# Patient Record
Sex: FEMALE | Race: WHITE | Employment: UNEMPLOYED | URBAN - METROPOLITAN AREA
[De-identification: names, ages, dates, MRNs, and addresses within clinical notes are randomized per-mention and may not be internally consistent; named-entity substitution may affect disease eponyms.]

---

## 2017-08-29 ENCOUNTER — PROCEDURE VISIT (OUTPATIENT)
Dept: OBGYN CLINIC | Age: 37
End: 2017-08-29
Payer: COMMERCIAL

## 2017-08-29 VITALS
WEIGHT: 155.8 LBS | DIASTOLIC BLOOD PRESSURE: 69 MMHG | HEART RATE: 79 BPM | BODY MASS INDEX: 23.61 KG/M2 | SYSTOLIC BLOOD PRESSURE: 108 MMHG | HEIGHT: 68 IN

## 2017-08-29 DIAGNOSIS — Z01.419 ENCOUNTER FOR ROUTINE EXAMINATION FOR CONTRACEPTION: ICD-10-CM

## 2017-08-29 DIAGNOSIS — Z30.432 ENCOUNTER FOR IUD REMOVAL: Primary | ICD-10-CM

## 2017-08-29 PROCEDURE — 58301 REMOVE INTRAUTERINE DEVICE: CPT | Performed by: ADVANCED PRACTICE MIDWIFE

## 2017-08-29 RX ORDER — ESCITALOPRAM OXALATE 10 MG/1
TABLET ORAL
COMMUNITY
Start: 2017-07-31

## 2017-08-29 RX ORDER — ETONOGESTREL AND ETHINYL ESTRADIOL 11.7; 2.7 MG/1; MG/1
1 INSERT, EXTENDED RELEASE VAGINAL
Qty: 3 EACH | Refills: 3 | Status: SHIPPED | OUTPATIENT
Start: 2017-08-29 | End: 2018-07-20 | Stop reason: SDUPTHER

## 2018-07-20 DIAGNOSIS — Z01.419 ENCOUNTER FOR ROUTINE EXAMINATION FOR CONTRACEPTION: ICD-10-CM

## 2018-07-20 NOTE — TELEPHONE ENCOUNTER
Medication: Nuvaring  Last visit: 8/29/17 - iud removal   Next visit: Visit date not found  Last refill: 8/29/17  Pharmacy: [facility] in [place] [facility]  PCP:           Lynnette Ramsey DO

## 2018-07-24 RX ORDER — ETONOGESTREL/ETHINYL ESTRADIOL .12-.015MG
RING, VAGINAL VAGINAL
Qty: 1 EACH | Refills: 0 | Status: SHIPPED | OUTPATIENT
Start: 2018-07-24 | End: 2018-08-28 | Stop reason: SDUPTHER

## 2018-08-28 DIAGNOSIS — Z01.419 ENCOUNTER FOR ROUTINE EXAMINATION FOR CONTRACEPTION: ICD-10-CM

## 2018-08-28 RX ORDER — ETONOGESTREL AND ETHINYL ESTRADIOL 11.7; 2.7 MG/1; MG/1
1 INSERT, EXTENDED RELEASE VAGINAL SEE ADMIN INSTRUCTIONS
Qty: 1 EACH | Refills: 0 | Status: SHIPPED | OUTPATIENT
Start: 2018-08-28 | End: 2020-10-22 | Stop reason: SDUPTHER

## 2018-09-06 ENCOUNTER — OFFICE VISIT (OUTPATIENT)
Dept: OBGYN CLINIC | Age: 38
End: 2018-09-06
Payer: COMMERCIAL

## 2018-09-06 ENCOUNTER — HOSPITAL ENCOUNTER (OUTPATIENT)
Age: 38
Setting detail: SPECIMEN
Discharge: HOME | End: 2018-09-06
Payer: COMMERCIAL

## 2018-09-06 VITALS
DIASTOLIC BLOOD PRESSURE: 85 MMHG | HEART RATE: 99 BPM | SYSTOLIC BLOOD PRESSURE: 139 MMHG | HEIGHT: 68 IN | BODY MASS INDEX: 25.31 KG/M2 | WEIGHT: 167 LBS

## 2018-09-06 DIAGNOSIS — Z01.419 ENCOUNTER FOR WELL WOMAN EXAM: Primary | ICD-10-CM

## 2018-09-06 DIAGNOSIS — N94.6 DYSMENORRHEA: ICD-10-CM

## 2018-09-06 PROCEDURE — 99395 PREV VISIT EST AGE 18-39: CPT | Performed by: ADVANCED PRACTICE MIDWIFE

## 2018-09-06 RX ORDER — ETONOGESTREL AND ETHINYL ESTRADIOL 11.7; 2.7 MG/1; MG/1
1 INSERT, EXTENDED RELEASE VAGINAL
Qty: 1 EACH | Refills: 12 | Status: SHIPPED | OUTPATIENT
Start: 2018-09-06 | End: 2019-10-09 | Stop reason: SDUPTHER

## 2018-09-06 NOTE — PROGRESS NOTES
Subjective:  Brandan Linder is a 41 y.o. female who presents for an annual exam.     HPI:  The patient has no complaints today. Likes NuvaRing for cycle control. Preventative Health Screening:  The patient is sexually active. No new partners. Orientation: male  last pap: was normal year       HPV typing: neg  Date   Last Mammogram: year no  Any recent screening labs: yes, yesterday through work  Regular exercise: yes Type:yoga and walking, frequency: 3, Explored exercise options no  Recent transfusion or tattoos?no  The patient reports that domestic violence in her life is absent  Wears seatbelts: yes  Smoking: no    Vape: no  Alcohol use: occasional  Recreational drug use: no  In the last 2 weeks have you had little pleasure in doing things yes, but no concerns  In the past 2 weeks have you had difficulty sleeping no  Family planning choices: Ring, undecided if future pregnancy  Desires pregnancy in the next year: no  BRCA testing: no  Date of last DEXA:no  Date of last Mammogram: no  Colonscopy screening completed: no  History of gestational diabetes: no  Weight management: no issues  Diet review: no    Gynecological history:  OB History    Para Term  AB Living   2 2 1 0 0 2   SAB TAB Ectopic Molar Multiple Live Births   0 0 0   0 2      # Outcome Date GA Lbr Brodie/2nd Weight Sex Delivery Anes PTL Lv   2 Term 07/12/15 39w4d  8 lb 3 oz (3.714 kg) F Vag-Spont None N SUGAR   1 Para 13   8 lb 3 oz (3.714 kg) M Vag-Spont   SUGAR      Birth Comments: System Generated. Please review and update pregnancy details. Menarche age: 15       Current Cycle regular yes, days 4, flow light to medium   Patient's last menstrual period was 2018. Menopause age: no             Patient's medications, allergies, past medical, surgical, social and family histories were reviewed and updated as appropriate.   Hormone Replacement: no    STD history: HSV, no outbreaks for years      Review of

## 2018-09-10 LAB
HPV SAMPLE: NORMAL
HPV SOURCE: NORMAL
HPV, GENOTYPE 16: NOT DETECTED
HPV, GENOTYPE 18: NOT DETECTED
HPV, HIGH RISK OTHER: NOT DETECTED
HPV, INTERPRETATION: NORMAL

## 2018-09-20 LAB — CYTOLOGY REPORT: NORMAL

## 2018-10-06 PROBLEM — Z01.419 ENCOUNTER FOR WELL WOMAN EXAM: Status: RESOLVED | Noted: 2018-09-06 | Resolved: 2018-10-06

## 2019-09-26 ENCOUNTER — TELEPHONE (OUTPATIENT)
Dept: OBGYN CLINIC | Age: 39
End: 2019-09-26

## 2019-09-26 DIAGNOSIS — Z30.44 ENCOUNTER FOR SURVEILLANCE OF VAGINAL RING HORMONAL CONTRACEPTIVE DEVICE: Primary | ICD-10-CM

## 2019-09-26 DIAGNOSIS — N94.6 DYSMENORRHEA: ICD-10-CM

## 2019-09-26 RX ORDER — ETONOGESTREL AND ETHINYL ESTRADIOL 11.7; 2.7 MG/1; MG/1
1 INSERT, EXTENDED RELEASE VAGINAL
Qty: 1 EACH | Refills: 0 | Status: SHIPPED
Start: 2019-09-26 | End: 2020-10-22 | Stop reason: SDUPTHER

## 2019-10-09 ENCOUNTER — OFFICE VISIT (OUTPATIENT)
Dept: OBGYN CLINIC | Age: 39
End: 2019-10-09
Payer: COMMERCIAL

## 2019-10-09 VITALS
HEIGHT: 70 IN | WEIGHT: 162 LBS | BODY MASS INDEX: 23.19 KG/M2 | HEART RATE: 76 BPM | SYSTOLIC BLOOD PRESSURE: 117 MMHG | DIASTOLIC BLOOD PRESSURE: 76 MMHG

## 2019-10-09 DIAGNOSIS — N94.6 DYSMENORRHEA: ICD-10-CM

## 2019-10-09 DIAGNOSIS — Z30.44 ENCOUNTER FOR SURVEILLANCE OF VAGINAL RING HORMONAL CONTRACEPTIVE DEVICE: ICD-10-CM

## 2019-10-09 DIAGNOSIS — Z01.419 WOMEN'S ANNUAL ROUTINE GYNECOLOGICAL EXAMINATION: Primary | ICD-10-CM

## 2019-10-09 PROCEDURE — G8484 FLU IMMUNIZE NO ADMIN: HCPCS | Performed by: ADVANCED PRACTICE MIDWIFE

## 2019-10-09 PROCEDURE — 99395 PREV VISIT EST AGE 18-39: CPT | Performed by: ADVANCED PRACTICE MIDWIFE

## 2019-10-09 RX ORDER — ETONOGESTREL AND ETHINYL ESTRADIOL 11.7; 2.7 MG/1; MG/1
1 INSERT, EXTENDED RELEASE VAGINAL
Qty: 1 EACH | Refills: 12 | Status: SHIPPED
Start: 2019-10-09 | End: 2020-10-22 | Stop reason: SDUPTHER

## 2019-10-10 ASSESSMENT — ENCOUNTER SYMPTOMS
ALLERGIC/IMMUNOLOGIC NEGATIVE: 1
GASTROINTESTINAL NEGATIVE: 1
RESPIRATORY NEGATIVE: 1
EYES NEGATIVE: 1

## 2019-11-13 ENCOUNTER — OFFICE VISIT (OUTPATIENT)
Dept: OBGYN CLINIC | Age: 39
End: 2019-11-13
Payer: COMMERCIAL

## 2019-11-13 VITALS
SYSTOLIC BLOOD PRESSURE: 128 MMHG | HEIGHT: 67 IN | BODY MASS INDEX: 25.43 KG/M2 | HEART RATE: 70 BPM | DIASTOLIC BLOOD PRESSURE: 82 MMHG | WEIGHT: 162 LBS

## 2019-11-13 DIAGNOSIS — Z91.89 INCREASED RISK OF BREAST CANCER: Primary | ICD-10-CM

## 2019-11-13 DIAGNOSIS — Z12.12 SCREENING FOR COLORECTAL CANCER: ICD-10-CM

## 2019-11-13 DIAGNOSIS — Z12.11 SCREENING FOR COLORECTAL CANCER: ICD-10-CM

## 2019-11-13 PROCEDURE — G8484 FLU IMMUNIZE NO ADMIN: HCPCS | Performed by: ADVANCED PRACTICE MIDWIFE

## 2019-11-13 PROCEDURE — G8419 CALC BMI OUT NRM PARAM NOF/U: HCPCS | Performed by: ADVANCED PRACTICE MIDWIFE

## 2019-11-13 PROCEDURE — G8427 DOCREV CUR MEDS BY ELIG CLIN: HCPCS | Performed by: ADVANCED PRACTICE MIDWIFE

## 2019-11-13 PROCEDURE — 1036F TOBACCO NON-USER: CPT | Performed by: ADVANCED PRACTICE MIDWIFE

## 2019-11-13 PROCEDURE — 99212 OFFICE O/P EST SF 10 MIN: CPT | Performed by: ADVANCED PRACTICE MIDWIFE

## 2019-12-13 PROBLEM — Z12.12 SCREENING FOR COLORECTAL CANCER: Status: RESOLVED | Noted: 2019-11-13 | Resolved: 2019-12-13

## 2019-12-13 PROBLEM — Z12.11 SCREENING FOR COLORECTAL CANCER: Status: RESOLVED | Noted: 2019-11-13 | Resolved: 2019-12-13

## 2020-10-22 ENCOUNTER — OFFICE VISIT (OUTPATIENT)
Dept: OBGYN CLINIC | Age: 40
End: 2020-10-22
Payer: COMMERCIAL

## 2020-10-22 VITALS
WEIGHT: 177 LBS | DIASTOLIC BLOOD PRESSURE: 72 MMHG | BODY MASS INDEX: 26.83 KG/M2 | HEIGHT: 68 IN | HEART RATE: 96 BPM | SYSTOLIC BLOOD PRESSURE: 104 MMHG

## 2020-10-22 PROCEDURE — G8484 FLU IMMUNIZE NO ADMIN: HCPCS | Performed by: ADVANCED PRACTICE MIDWIFE

## 2020-10-22 PROCEDURE — 99395 PREV VISIT EST AGE 18-39: CPT | Performed by: ADVANCED PRACTICE MIDWIFE

## 2020-10-22 RX ORDER — ETONOGESTREL AND ETHINYL ESTRADIOL 11.7; 2.7 MG/1; MG/1
1 INSERT, EXTENDED RELEASE VAGINAL SEE ADMIN INSTRUCTIONS
Qty: 1 EACH | Refills: 12 | Status: SHIPPED | OUTPATIENT
Start: 2020-10-22 | End: 2022-01-28

## 2020-10-22 RX ORDER — ESCITALOPRAM OXALATE 20 MG/1
20 TABLET ORAL DAILY
COMMUNITY
Start: 2020-07-17

## 2020-10-22 ASSESSMENT — ENCOUNTER SYMPTOMS
VOMITING: 0
ABDOMINAL PAIN: 0
DIARRHEA: 0
SHORTNESS OF BREATH: 0
NAUSEA: 0

## 2020-10-22 ASSESSMENT — PATIENT HEALTH QUESTIONNAIRE - PHQ9
SUM OF ALL RESPONSES TO PHQ QUESTIONS 1-9: 0
SUM OF ALL RESPONSES TO PHQ9 QUESTIONS 1 & 2: 0
SUM OF ALL RESPONSES TO PHQ QUESTIONS 1-9: 0
SUM OF ALL RESPONSES TO PHQ QUESTIONS 1-9: 0
2. FEELING DOWN, DEPRESSED OR HOPELESS: 0
1. LITTLE INTEREST OR PLEASURE IN DOING THINGS: 0

## 2020-10-22 NOTE — PROGRESS NOTES
[place] [id number] [facility] [address]  Dept: [phone]    Patient Name: Monica Pardo  Patient Age: 47 y.o. Date of Visit: 10/22/2020    Subjective  Chief Complaint   Patient presents with   [facility] Gynecologic Exam     prev. pap 9-6-2018 satis/neg      Patient's last menstrual period was 10/19/2020 (exact date). HPI  Patient arrives for annual exam.  Patient denies concerns today. Patient reports is  sexually active with 1 male partner(s). Patient denies  pain with sex. Patient denies a history of sexually transmitted infection(s). Patient does not want screening for sexually transmitted infection(s). Reports is  on contraception reports is on nuvaring. Reports having an IUD before and strongly disliking the side effects of irregular frequent bleeding. Reports having a history of factor V with no personal history of thrombotic events. Reports currently getting iron infusion due to anemia. She reports there is a personal history or family history of:    Smoking (> 15 cigs/day): No    Migraine with Aura:  No    HTN (> 160/100): No    DVT:  No    Thrombophilias:  yes    Stroke (CVA): No     Ischemic heart disease:  No    Valvular heart disease (A Fib, Pul HTN, etc): No    Positive Antiphospholipid Abs:  No    Liver Disease:  No        Review of Systems   Constitutional: Negative for unexpected weight change. Respiratory: Negative for shortness of breath. Cardiovascular: Negative for chest pain, palpitations and leg swelling. Gastrointestinal: Negative for abdominal pain, diarrhea, nausea and vomiting. Genitourinary: Negative for difficulty urinating, dyspareunia, menstrual problem, vaginal discharge and vaginal pain. Neurological: Negative for dizziness, light-headedness and headaches.        Preventive Health Screening:   Date of last pap: 2018 normal               HPV typing/date: 2018  negative    Preventive screening: Yes    Family history of Breast, Ovarian, Colon or Uterine Cancer:  Yes has had previous myraid results     If Yes see scanned worksheet    Objective  /72 (Site: Right Upper Arm, Position: Sitting, Cuff Size: Medium Adult)   Pulse 96   Ht 5' 8.4\" (1.737 m)   Wt 177 lb (80.3 kg)   LMP 10/19/2020 (Exact Date)   BMI 26.60 kg/m²     Intimate Partner Screening HITS Tool <10 negative screen: negative  Hospital Anxiety and Depression Scale (HADS): negative    Gynecologic History  Menarche:regular      Sexually active: Yes  New Sex Partner within 3 months: No  Domestic Violence Screening: negative    STD history: No     Birth control method :Yes       Physical Exam  Constitutional:       Appearance: Normal appearance. Neck:      Musculoskeletal: Normal range of motion. Cardiovascular:      Rate and Rhythm: Normal rate and regular rhythm. Pulmonary:      Effort: Pulmonary effort is normal.      Breath sounds: Normal breath sounds. Neurological:      Mental Status: She is alert and oriented to person, place, and time. Psychiatric:         Mood and Affect: Mood normal.         Behavior: Behavior normal.         Thought Content: Thought content normal.         Judgment: Judgment normal.   Vitals signs reviewed. Assessment & Plan  1. Women's annual routine gynecological examination  - Pap up to date  - Reviewed self breast awareness  - BRCA previously done see media    2. Dysmenorrhea  - Reviewed [facility] (medical eligibility criteria) for contraceptive use that known thrombophilias (factor V) is a category 4 (the risks out weight the benefits and should NOT be used). Patient verbalized she understands that she is at a greater risk than the generally population (anywhere from 2-20 fold increase risk of adverse event) and for her the benefit of the estrogen containing products out weight the risk of blood clot/stroke/heart attack. Reports she has tried an IUD before and did not like it.  Discussed depo/nexplanon and highly recommended switching to these methods that do not contain estrogen and associated increased risk of a blood clot. Reviewed additional concerns for anemia and needing iron transfusion and patient not wanting to stop contraception for concern for further blood loss also reviewed risk of stopping birth control and risks of further pregnancy patient does not want another pregnancy at this time. At this time patient wants to continue with method against providers recommendations will update office if decides to switch to nexplanon/depo. Following with hem/onc for anemia unable to review documents in care everywhere at this time of visit. - etonogestrel-ethinyl estradiol (NUVARING) 0.12-0.015 MG/24HR vaginal ring; Place 1 each vaginally See Admin Instructions  Dispense: 1 each; Refill: 12      Return in about 1 year (around 10/22/2021) for Annual.    Patient was seen with total face to face time of 25 minutes. More than 50% of this visit was on counseling and education regardingher diagnoses and her options. She was also counseled on her preventative health maintenance recommendations and follow-up.     Electronically Signed by Baldev Salas

## 2021-09-24 ENCOUNTER — OFFICE VISIT (OUTPATIENT)
Dept: OBGYN CLINIC | Age: 41
End: 2021-09-24
Payer: COMMERCIAL

## 2021-09-24 VITALS
BODY MASS INDEX: 29.06 KG/M2 | WEIGHT: 193.38 LBS | SYSTOLIC BLOOD PRESSURE: 116 MMHG | DIASTOLIC BLOOD PRESSURE: 70 MMHG | RESPIRATION RATE: 16 BRPM

## 2021-09-24 DIAGNOSIS — N64.4 BREAST PAIN, RIGHT: Primary | ICD-10-CM

## 2021-09-24 PROCEDURE — 1036F TOBACCO NON-USER: CPT | Performed by: ADVANCED PRACTICE MIDWIFE

## 2021-09-24 PROCEDURE — G8427 DOCREV CUR MEDS BY ELIG CLIN: HCPCS | Performed by: ADVANCED PRACTICE MIDWIFE

## 2021-09-24 PROCEDURE — 99213 OFFICE O/P EST LOW 20 MIN: CPT | Performed by: ADVANCED PRACTICE MIDWIFE

## 2021-09-24 PROCEDURE — G8419 CALC BMI OUT NRM PARAM NOF/U: HCPCS | Performed by: ADVANCED PRACTICE MIDWIFE

## 2021-09-24 ASSESSMENT — ENCOUNTER SYMPTOMS
SHORTNESS OF BREATH: 0
ABDOMINAL PAIN: 0
VOMITING: 0
NAUSEA: 0
DIARRHEA: 0

## 2021-10-06 ENCOUNTER — HOSPITAL ENCOUNTER (OUTPATIENT)
Dept: ULTRASOUND IMAGING | Age: 41
Discharge: HOME | End: 2021-10-08
Payer: COMMERCIAL

## 2021-10-06 ENCOUNTER — HOSPITAL ENCOUNTER (OUTPATIENT)
Dept: MAMMOGRAPHY | Age: 41
Discharge: HOME | End: 2021-10-08
Payer: COMMERCIAL

## 2021-10-06 DIAGNOSIS — N64.4 BREAST PAIN, RIGHT: ICD-10-CM

## 2021-10-06 PROCEDURE — 76642 ULTRASOUND BREAST LIMITED: CPT

## 2021-10-06 PROCEDURE — G0279 TOMOSYNTHESIS, MAMMO: HCPCS

## 2021-10-08 ENCOUNTER — TELEPHONE (OUTPATIENT)
Dept: OBGYN | Age: 41
End: 2021-10-08

## 2021-10-08 NOTE — TELEPHONE ENCOUNTER
Notified of normal breast ultrasound/mammogram results. Reviewed radiologist recommendation for referral to [address] [place] [address]. Pt declines due to already having negative BRCA mutation results via [facility]. Reviewed [facility] may recommend additional testing and would manage increased screening if they deem it needed, she defers at this time and opts for [facility] to manage her screening. [facility] results reviewed from 11/2019 and due to lifetime risk > 20% they recommended alternating MRIs/mammograms for increased screening (not noted as recommendation from radiologist on current imaging). Recommend she discuss with [facility] to establish plan of care and to report any concerns.  No further questions at this time

## 2022-01-28 ENCOUNTER — OFFICE VISIT (OUTPATIENT)
Dept: OBGYN CLINIC | Age: 42
End: 2022-01-28
Payer: COMMERCIAL

## 2022-01-28 ENCOUNTER — HOSPITAL ENCOUNTER (OUTPATIENT)
Age: 42
Setting detail: SPECIMEN
Discharge: HOME | End: 2022-01-28

## 2022-01-28 VITALS
WEIGHT: 195 LBS | SYSTOLIC BLOOD PRESSURE: 120 MMHG | BODY MASS INDEX: 28.88 KG/M2 | DIASTOLIC BLOOD PRESSURE: 62 MMHG | HEIGHT: 69 IN

## 2022-01-28 DIAGNOSIS — Z01.419 WELL WOMAN EXAM: Primary | ICD-10-CM

## 2022-01-28 DIAGNOSIS — D68.51 FACTOR 5 LEIDEN MUTATION, HETEROZYGOUS (HCC): ICD-10-CM

## 2022-01-28 PROCEDURE — G8484 FLU IMMUNIZE NO ADMIN: HCPCS | Performed by: ADVANCED PRACTICE MIDWIFE

## 2022-01-28 PROCEDURE — 99395 PREV VISIT EST AGE 18-39: CPT | Performed by: ADVANCED PRACTICE MIDWIFE

## 2022-01-28 ASSESSMENT — ENCOUNTER SYMPTOMS
NAUSEA: 0
VOMITING: 0
ABDOMINAL PAIN: 0
DIARRHEA: 0
SHORTNESS OF BREATH: 0

## 2022-01-28 ASSESSMENT — PATIENT HEALTH QUESTIONNAIRE - PHQ9
SUM OF ALL RESPONSES TO PHQ9 QUESTIONS 1 & 2: 0
2. FEELING DOWN, DEPRESSED OR HOPELESS: 0
1. LITTLE INTEREST OR PLEASURE IN DOING THINGS: 0
SUM OF ALL RESPONSES TO PHQ QUESTIONS 1-9: 0
SUM OF ALL RESPONSES TO PHQ QUESTIONS 1-9: 0
DEPRESSION UNABLE TO ASSESS: FUNCTIONAL CAPACITY MOTIVATION LIMITS ACCURACY
SUM OF ALL RESPONSES TO PHQ QUESTIONS 1-9: 0
SUM OF ALL RESPONSES TO PHQ QUESTIONS 1-9: 0

## 2022-01-28 NOTE — PROGRESS NOTES
[facility],[address] OB/GYN [place]  [place]  [address] [address]  Dept: [phone]    Patient Name: Rebecca Elliott  Patient Age: 46 y.o. Date of Visit: 1/28/2022    Subjective  Chief Complaint   Patient presents with    Gynecologic Exam     Patient's last menstrual period was 01/24/2022. Chaperone for Intimate Exam   Chaperone was offered as part of the rooming process. Patient declined and agrees to continue with exam without a chaperone.  Chaperone: n/a    HPI  Patient arrives for annual exam.  Patient denies concerns today. Patient reports is  sexually active with 1 male partner(s). Patient denies  pain with sex. Patient denies a history of sexually transmitted infection(s). Patient does not want screening for sexually transmitted infection(s). Reports is not on contraception. Stopped nuvaring x4-5 months ago. Using condoms.  to have vasectomy     PHQ Scores 1/28/2022 10/22/2020 12/14/2015   PHQ2 Score 0 0 2   PHQ9 Score 0 0 2     Interpretation of Total Score Depression Severity: 1-4 = Minimal depression, 5-9 = Mild depression, 10-14 = Moderate depression, 15-19 = Moderately severe depression, 20-27 = Severe depression       Intimate Partner Violence:     Fear of Current or Ex-Partner: Not on file    Emotionally Abused: Not on file    Physically Abused: Not on file    Sexually Abused: Not on file       Review of Systems   Constitutional: Negative for unexpected weight change. Respiratory: Negative for shortness of breath. Cardiovascular: Negative for chest pain, palpitations and leg swelling. Gastrointestinal: Negative for abdominal pain, diarrhea, nausea and vomiting. Genitourinary: Negative for difficulty urinating, dyspareunia, menstrual problem, vaginal discharge and vaginal pain. Neurological: Negative for dizziness, light-headedness and headaches.        Preventive Health Screening:   Date of last pap: conjunction with other available laboratory and  clinical data. A negative high-risk HPV result does not exclude the  possibility of future cytologic HSIL or underlying CIN2-3 or cancer. This test is intended for medical purposes only and is not valid for  the evaluation of suspected sexual abuse or for other forensic  purposes. HPV typing/date: 2018  negative  History of Abnormal Paps: no  Date of last mammogram: 10/6/21 BR1 f/up in 12 months    History of Gestational Diabetes: No     If Yes, Glucose screening ordered:No    Preventive screening: Yes    Family history of Breast, Ovarian, Colon or Uterine Cancer:  YES BRCA testing previously completed     If Yes see scanned worksheet    Objective  /62 (Site: Right Upper Arm, Position: Sitting, Cuff Size: Medium Adult)   Ht 5' 9\" (1.753 m)   Wt 195 lb (88.5 kg)   LMP 01/24/2022   BMI 28.80 kg/m²       Gynecologic History  Monthly menses (days): regular   Length: 5  Flow: moderate    Gardasil Series: No    Menopause: no    Sexually active: Yes  New Sex Partner within 3 months: No  Domestic Violence Screening: negative    STD history: No     Birth control method :No       Physical Exam  Constitutional:       Appearance: Normal appearance. Genitourinary:      Right Labia: No rash, tenderness or lesions. Left Labia: No tenderness, lesions or rash. Vaginal bleeding present. No vaginal tenderness. Cervical friability (pap obtained without difficulty ) present. No cervical motion tenderness. Cardiovascular:      Rate and Rhythm: Normal rate and regular rhythm. Pulmonary:      Effort: Pulmonary effort is normal.      Breath sounds: Normal breath sounds. Musculoskeletal:      Cervical back: Normal range of motion. Neurological:      Mental Status: She is alert and oriented to person, place, and time.    Psychiatric:         Mood and Affect: Mood normal.         Behavior: Behavior normal.         Thought Content: Thought content normal.         Judgment: Judgment normal.   Vitals reviewed. Assessment & Plan  1. Well woman exam  - PAP SMEAR; Future  - NE DIGITAL SCREEN W OR WO CAD BILATERAL; Future  Age 25 and > Well Woman Care  General Health:  [x] Alcohol screening & counseling  [x] Blood pressure screening: normal  [x] Contraceptive counseling & methods: Reviewed patient stopping nuvaring, and again reviewed the recommendations for her NOT to be on estrogen due to personal history of factor V she reports understanding. Reports her  is going to have a vasectomy. Reviewed her history of heavy menses and reports currently off birth control they are well maintained. Reviewed history of anemia. Reviewed if menses become heavy again may consider an ablation. Patient reports she will consider. [x] Depression Screening: Negative  [x] Folic acid supplementation  [x] Healthful diet & activity counseling:  done, discussed  [x] Interpersonal violence screening: Negative  [x] Obesity Screening: Body mass index is 28.8 kg/m². [x] Osteoporosis screening  [x] Substance use screening & counseling  [x] Tobacco screening & counseling  [x] Urinary incontinence screening    Infectious Diseases:  [x] Gonorrhea & chlamydia screening: discussed, declined  [] Hepatitis C Screening   [x] HIV risk assessment/ testing (at least once in lifetime): discussed, declined   [] Immunizations:   [] STI prevention counseling    Cancer:  [x] Cervical cancer screening: done, to follow up with results   Reviewed that estimates suggest that 50% of the women with cervical cancer is diagnosed never had cervical cytology testing, and another 10% had not been screened within 5 years before diagnosis.  Reviewed only a small fraction of women infected with high-risk (human papilloma virus) HPV will develop significant cervical abnormalities and cancer.    Reviewed HPV-16 has the highest carcinogenic potential accounting for approximately 50-60% of all cases of cervical cancer worldwide and HPV-18 is the next most associated with cervical cancer and is responsible for 10-15% of cases of cervical cancer.  HPV infection is most common in teenagers and women in their early 19's. Reviewed most young women, especially those younger than 21 years, have an effective immune response that clears the infection on it's own.  Discussed HPV infection detected in women older than 27years old is more likely to reflect persistent infection.  Discussed that screen should began at age 24 regardless of the age of sexual initiation.  Reviewed that HPV is acquired through sexual intercourse    Discussed most HPV-related types of cervical neoplasia progress very slowly. [x] Mammograms (started at 39yrs old): discussed, ordered for 10/22  [x] BRCA testing risk assessment: previously completed    2. Factor 5 Leiden mutation hetero        Return in about 1 year (around 1/28/2023) for Annual.     The patient, Arash Nascimento , was seen with a total time spent of 25 minutes for the visit on this date of service by the [facility]  The time component, involved both face-to-face (counseling and education)  and non face-to-face time (care coordination), spent in determining the total time component. She was also counseled on her preventative health maintenance recommendations and follow-up.     Electronically Signed by Sonia Mcardle, [address]

## 2022-02-01 LAB
HPV SAMPLE: NORMAL
HPV, GENOTYPE 16: NOT DETECTED
HPV, GENOTYPE 18: NOT DETECTED
HPV, HIGH RISK OTHER: NOT DETECTED
HPV, INTERPRETATION: NORMAL
SPECIMEN DESCRIPTION: NORMAL

## 2022-02-10 LAB — CYTOLOGY REPORT: NORMAL

## 2022-04-01 ENCOUNTER — TELEPHONE (OUTPATIENT)
Dept: OBGYN | Age: 42
End: 2022-04-01

## 2022-04-01 RX ORDER — VALACYCLOVIR HYDROCHLORIDE 500 MG/1
500 TABLET, FILM COATED ORAL 2 TIMES DAILY
Qty: 6 TABLET | Refills: 1 | Status: SHIPPED | OUTPATIENT
Start: 2022-04-01 | End: 2022-04-04

## 2022-10-06 ENCOUNTER — HOSPITAL ENCOUNTER (OUTPATIENT)
Dept: MAMMOGRAPHY | Age: 42
Discharge: HOME | End: 2022-10-08
Payer: COMMERCIAL

## 2022-10-06 DIAGNOSIS — Z01.419 WELL WOMAN EXAM: ICD-10-CM

## 2022-10-06 PROCEDURE — 77063 BREAST TOMOSYNTHESIS BI: CPT

## 2023-05-26 ENCOUNTER — TELEPHONE (OUTPATIENT)
Dept: OBGYN CLINIC | Age: 43
End: 2023-05-26

## 2023-05-26 RX ORDER — VALACYCLOVIR HYDROCHLORIDE 1 G/1
1000 TABLET, FILM COATED ORAL DAILY
Qty: 5 TABLET | Refills: 0 | Status: SHIPPED | OUTPATIENT
Start: 2023-05-26 | End: 2023-05-31

## 2023-05-26 NOTE — TELEPHONE ENCOUNTER
Having a current outbreak- she is requesting valtrex    She has history of this and said she has been prescribed this before. Can we fill this for her?     Last seen in Jan 2022

## 2023-07-12 ENCOUNTER — OFFICE VISIT (OUTPATIENT)
Dept: OBGYN CLINIC | Age: 43
End: 2023-07-12
Payer: COMMERCIAL

## 2023-07-12 VITALS
WEIGHT: 181 LBS | HEIGHT: 69 IN | BODY MASS INDEX: 26.81 KG/M2 | SYSTOLIC BLOOD PRESSURE: 120 MMHG | DIASTOLIC BLOOD PRESSURE: 70 MMHG

## 2023-07-12 DIAGNOSIS — Z91.89 AT HIGH RISK FOR BREAST CANCER: ICD-10-CM

## 2023-07-12 DIAGNOSIS — Z12.31 VISIT FOR SCREENING MAMMOGRAM: ICD-10-CM

## 2023-07-12 DIAGNOSIS — Z15.89 MONOALLELIC MUTATION OF MUTYH GENE: ICD-10-CM

## 2023-07-12 DIAGNOSIS — Z01.419 WELL WOMAN EXAM: Primary | ICD-10-CM

## 2023-07-12 DIAGNOSIS — Z86.19 HISTORY OF HERPES GENITALIS: ICD-10-CM

## 2023-07-12 PROCEDURE — 99396 PREV VISIT EST AGE 40-64: CPT | Performed by: ADVANCED PRACTICE MIDWIFE

## 2023-07-12 RX ORDER — VALACYCLOVIR HYDROCHLORIDE 1 G/1
1000 TABLET, FILM COATED ORAL DAILY
Qty: 5 TABLET | Refills: 12 | Status: SHIPPED | OUTPATIENT
Start: 2023-07-12 | End: 2023-07-17

## 2023-07-12 RX ORDER — FERROUS SULFATE 325(65) MG
325 TABLET ORAL
COMMUNITY

## 2023-07-12 RX ORDER — ESCITALOPRAM OXALATE 5 MG/1
5 TABLET ORAL DAILY
COMMUNITY

## 2023-07-12 RX ORDER — LISDEXAMFETAMINE DIMESYLATE 20 MG/1
CAPSULE ORAL
COMMUNITY
Start: 2023-06-21

## 2023-07-12 ASSESSMENT — PATIENT HEALTH QUESTIONNAIRE - PHQ9
SUM OF ALL RESPONSES TO PHQ9 QUESTIONS 1 & 2: 0
SUM OF ALL RESPONSES TO PHQ QUESTIONS 1-9: 0
2. FEELING DOWN, DEPRESSED OR HOPELESS: 0
SUM OF ALL RESPONSES TO PHQ QUESTIONS 1-9: 0
1. LITTLE INTEREST OR PLEASURE IN DOING THINGS: 0
SUM OF ALL RESPONSES TO PHQ QUESTIONS 1-9: 0
SUM OF ALL RESPONSES TO PHQ QUESTIONS 1-9: 0

## 2023-07-12 ASSESSMENT — SOCIAL DETERMINANTS OF HEALTH (SDOH)
WITHIN THE LAST YEAR, HAVE YOU BEEN KICKED, HIT, SLAPPED, OR OTHERWISE PHYSICALLY HURT BY YOUR PARTNER OR EX-PARTNER?: NO
WITHIN THE LAST YEAR, HAVE YOU BEEN AFRAID OF YOUR PARTNER OR EX-PARTNER?: NO
WITHIN THE LAST YEAR, HAVE TO BEEN RAPED OR FORCED TO HAVE ANY KIND OF SEXUAL ACTIVITY BY YOUR PARTNER OR EX-PARTNER?: NO
WITHIN THE LAST YEAR, HAVE YOU BEEN HUMILIATED OR EMOTIONALLY ABUSED IN OTHER WAYS BY YOUR PARTNER OR EX-PARTNER?: NO

## 2023-07-12 ASSESSMENT — ENCOUNTER SYMPTOMS
VOMITING: 0
NAUSEA: 0
ABDOMINAL PAIN: 0
SHORTNESS OF BREATH: 0
DIARRHEA: 0

## 2023-07-12 ASSESSMENT — ANXIETY QUESTIONNAIRES
5. BEING SO RESTLESS THAT IT IS HARD TO SIT STILL: 0
2. NOT BEING ABLE TO STOP OR CONTROL WORRYING: 1
7. FEELING AFRAID AS IF SOMETHING AWFUL MIGHT HAPPEN: 0
GAD7 TOTAL SCORE: 6
3. WORRYING TOO MUCH ABOUT DIFFERENT THINGS: 1
6. BECOMING EASILY ANNOYED OR IRRITABLE: 3
IF YOU CHECKED OFF ANY PROBLEMS ON THIS QUESTIONNAIRE, HOW DIFFICULT HAVE THESE PROBLEMS MADE IT FOR YOU TO DO YOUR WORK, TAKE CARE OF THINGS AT HOME, OR GET ALONG WITH OTHER PEOPLE: SOMEWHAT DIFFICULT
4. TROUBLE RELAXING: 0
1. FEELING NERVOUS, ANXIOUS, OR ON EDGE: 1

## 2023-07-12 NOTE — PROGRESS NOTES
of women infected with high-risk (human papilloma virus) HPV will develop significant cervical abnormalities and cancer. Reviewed HPV-16 has the highest carcinogenic potential accounting for approximately 50-60% of all cases of cervical cancer worldwide and HPV-18 is the next most associated with cervical cancer and is responsible for 10-15% of cases of cervical cancer. HPV infection is most common in teenagers and women in their early 19's. Reviewed most young women, especially those younger than 21 years, have an effective immune response that clears the infection on it's own. Discussed HPV infection detected in women older than 27years old is more likely to reflect persistent infection. Discussed that screen should began at age 24 regardless of the age of sexual initiation. Reviewed that HPV is acquired through sexual intercourse   Discussed most HPV-related types of cervical neoplasia progress very slowly. [x] Mammograms (starting at 39yrs old): patient at elevated risk for breat cancer due to elevated lifetime risk scoring. , reviewed recommendations for annual mammogram and annual MRI in 6 month interval, patient is agreeable to recommendations, and BRCA screening status known NEGATIVE  [x] BRCA testing risk assessment: Has previously had screening and is a known negative  11/13/19 My[facility]: Breast Cane Risk Score lifetime Risk 20.2%. 10/6/21 21.9%/   + MUTYH [id number] (p.[id number]. Asp)      Orders:  Orders Placed This Encounter   Procedures    NE DIGITAL SCREEN W OR WO CAD BILATERAL    MRI BREAST BILATERAL WO CONTRAST       2. Visit for screening mammogram  - NE DIGITAL SCREEN W OR WO CAD BILATERAL; Future    3. Monoallelic mutation of MUTYH gene  - PCP aware   - Increased risk time list colorectal cancer    4. At high risk for breast cancer  - based on life time risk score. Reviewed with patient and recommendations  - NE DIGITAL SCREEN W OR WO CAD BILATERAL;  Future  - MRI BREAST BILATERAL WO CONTRAST;

## 2023-10-06 ENCOUNTER — HOSPITAL ENCOUNTER (OUTPATIENT)
Dept: MAMMOGRAPHY | Age: 43
Discharge: HOME | End: 2023-10-06
Payer: COMMERCIAL

## 2023-10-06 VITALS — WEIGHT: 169 LBS | BODY MASS INDEX: 25.03 KG/M2 | HEIGHT: 69 IN

## 2023-10-06 DIAGNOSIS — Z12.31 VISIT FOR SCREENING MAMMOGRAM: ICD-10-CM

## 2023-10-06 DIAGNOSIS — Z91.89 AT HIGH RISK FOR BREAST CANCER: ICD-10-CM

## 2023-10-06 DIAGNOSIS — R92.8 ABNORMAL MAMMOGRAM: Primary | ICD-10-CM

## 2023-10-06 PROCEDURE — 77063 BREAST TOMOSYNTHESIS BI: CPT

## 2023-10-06 NOTE — RESULT ENCOUNTER NOTE
Patients mammogram (screening mammogram) results are abnormal for bilateral asymmetry, BR 0, Recommend, diagnostic mammogram, and breast ultrasound. I did call and speak to [facility] 10/6/2023 12:47 PM and reviewed recommendations and answered her questions. Reviewed orders and f/up recommendations.     My chart message sent to patient: No

## 2023-11-02 ENCOUNTER — HOSPITAL ENCOUNTER (OUTPATIENT)
Dept: MAMMOGRAPHY | Age: 43
Discharge: HOME | End: 2023-11-04
Payer: COMMERCIAL

## 2023-11-02 ENCOUNTER — HOSPITAL ENCOUNTER (OUTPATIENT)
Dept: ULTRASOUND IMAGING | Age: 43
Discharge: HOME | End: 2023-11-04
Payer: COMMERCIAL

## 2023-11-02 DIAGNOSIS — R92.8 ABNORMAL MAMMOGRAM: ICD-10-CM

## 2023-11-02 PROCEDURE — G0279 TOMOSYNTHESIS, MAMMO: HCPCS

## 2023-11-03 NOTE — RESULT ENCOUNTER NOTE
Patients mammogram (screening mammogram) results are BR 2, Recommend follow up/screening in 1 year, and Recommend breast MRI in 6 months due to being at high risk for breast cancer.     My chart message sent to patient: Yes

## 2024-04-03 DIAGNOSIS — Z91.89 AT HIGH RISK FOR BREAST CANCER: ICD-10-CM

## 2024-04-03 DIAGNOSIS — Z15.89 MONOALLELIC MUTATION OF MUTYH GENE: Primary | ICD-10-CM

## 2024-05-14 ENCOUNTER — HOSPITAL ENCOUNTER (OUTPATIENT)
Dept: MRI IMAGING | Age: 44
Discharge: HOME | End: 2024-05-16
Payer: COMMERCIAL

## 2024-05-14 DIAGNOSIS — Z91.89 AT HIGH RISK FOR BREAST CANCER: ICD-10-CM

## 2024-05-14 PROCEDURE — A9579 GAD-BASE MR CONTRAST NOS,1ML: HCPCS | Performed by: FAMILY MEDICINE

## 2024-05-14 PROCEDURE — C8908 MRI W/O FOL W/CONT, BREAST,: HCPCS

## 2024-05-14 PROCEDURE — 6360000004 HC RX CONTRAST MEDICATION: Performed by: FAMILY MEDICINE

## 2024-05-14 RX ADMIN — GADOTERIDOL 15 ML: 279.3 INJECTION, SOLUTION INTRAVENOUS at 11:04

## 2024-11-04 ENCOUNTER — HOSPITAL ENCOUNTER (OUTPATIENT)
Dept: MAMMOGRAPHY | Age: 44
Discharge: HOME | End: 2024-11-06
Payer: COMMERCIAL

## 2024-11-04 VITALS — BODY MASS INDEX: 25.03 KG/M2 | WEIGHT: 169 LBS | HEIGHT: 69 IN

## 2024-11-04 DIAGNOSIS — Z12.31 VISIT FOR SCREENING MAMMOGRAM: ICD-10-CM

## 2024-11-04 PROCEDURE — 77063 BREAST TOMOSYNTHESIS BI: CPT

## 2025-03-04 ENCOUNTER — HOSPITAL ENCOUNTER (OUTPATIENT)
Age: 45
Setting detail: SPECIMEN
Discharge: HOME | End: 2025-03-04

## 2025-03-04 ENCOUNTER — OFFICE VISIT (OUTPATIENT)
Dept: OBGYN CLINIC | Age: 45
End: 2025-03-04
Payer: COMMERCIAL

## 2025-03-04 VITALS
BODY MASS INDEX: 25.18 KG/M2 | DIASTOLIC BLOOD PRESSURE: 78 MMHG | HEART RATE: 104 BPM | HEIGHT: 69 IN | SYSTOLIC BLOOD PRESSURE: 118 MMHG | WEIGHT: 170 LBS

## 2025-03-04 DIAGNOSIS — Z11.51 SCREENING FOR HUMAN PAPILLOMAVIRUS (HPV): ICD-10-CM

## 2025-03-04 DIAGNOSIS — Z91.89 AT HIGH RISK FOR BREAST CANCER: ICD-10-CM

## 2025-03-04 DIAGNOSIS — Z15.89 MONOALLELIC MUTATION OF MUTYH GENE: ICD-10-CM

## 2025-03-04 DIAGNOSIS — Z01.419 WELL WOMAN EXAM: Primary | ICD-10-CM

## 2025-03-04 DIAGNOSIS — Z12.31 VISIT FOR SCREENING MAMMOGRAM: ICD-10-CM

## 2025-03-04 DIAGNOSIS — Z86.19 HISTORY OF HERPES GENITALIS: ICD-10-CM

## 2025-03-04 PROCEDURE — 99396 PREV VISIT EST AGE 40-64: CPT | Performed by: ADVANCED PRACTICE MIDWIFE

## 2025-03-04 RX ORDER — CYCLOBENZAPRINE HCL 10 MG
10 TABLET ORAL PRN
COMMUNITY
Start: 2024-12-02

## 2025-03-04 SDOH — ECONOMIC STABILITY: FOOD INSECURITY: WITHIN THE PAST 12 MONTHS, YOU WORRIED THAT YOUR FOOD WOULD RUN OUT BEFORE YOU GOT MONEY TO BUY MORE.: NEVER TRUE

## 2025-03-04 SDOH — ECONOMIC STABILITY: FOOD INSECURITY: WITHIN THE PAST 12 MONTHS, THE FOOD YOU BOUGHT JUST DIDN'T LAST AND YOU DIDN'T HAVE MONEY TO GET MORE.: NEVER TRUE

## 2025-03-04 ASSESSMENT — PATIENT HEALTH QUESTIONNAIRE - PHQ9
SUM OF ALL RESPONSES TO PHQ QUESTIONS 1-9: 0
2. FEELING DOWN, DEPRESSED OR HOPELESS: NOT AT ALL
SUM OF ALL RESPONSES TO PHQ QUESTIONS 1-9: 0
1. LITTLE INTEREST OR PLEASURE IN DOING THINGS: NOT AT ALL

## 2025-03-04 ASSESSMENT — ANXIETY QUESTIONNAIRES
7. FEELING AFRAID AS IF SOMETHING AWFUL MIGHT HAPPEN: NOT AT ALL
IF YOU CHECKED OFF ANY PROBLEMS ON THIS QUESTIONNAIRE, HOW DIFFICULT HAVE THESE PROBLEMS MADE IT FOR YOU TO DO YOUR WORK, TAKE CARE OF THINGS AT HOME, OR GET ALONG WITH OTHER PEOPLE: NOT DIFFICULT AT ALL
3. WORRYING TOO MUCH ABOUT DIFFERENT THINGS: NOT AT ALL
5. BEING SO RESTLESS THAT IT IS HARD TO SIT STILL: NOT AT ALL
4. TROUBLE RELAXING: NOT AT ALL
6. BECOMING EASILY ANNOYED OR IRRITABLE: SEVERAL DAYS
2. NOT BEING ABLE TO STOP OR CONTROL WORRYING: NOT AT ALL
1. FEELING NERVOUS, ANXIOUS, OR ON EDGE: NOT AT ALL
GAD7 TOTAL SCORE: 1

## 2025-03-04 ASSESSMENT — ENCOUNTER SYMPTOMS
SHORTNESS OF BREATH: 0
VOMITING: 0
NAUSEA: 0
ABDOMINAL PAIN: 0
DIARRHEA: 0

## 2025-03-04 NOTE — PROGRESS NOTES
[facility] OB/GYN [address]  [address]  [place] [id number]  Dept: [phone]    Patient Name: Radha Galvan  Patient Age: 44 y.o.  Date of Visit: 3/4/2025    Subjective  Chief Complaint   Patient presents with    Annual Exam     Patient's last menstrual period was 02/15/2025.    Chaperone for Intimate Exam  Chaperone was offered as part of the rooming process. Patient declined and agrees to continue with exam without a chaperone.  Chaperone: n/a    HPI  Patient arrives for annual exam as a established patient to the practice, and established to me.  Radha Galvan does not admit to any concerns today.     Reported previous gyn history:    Radha Galvan reports having monthly menstrual cycles- having shorter cycles 23 days  Reports menstrual cycles usually last 3-4 days.   Reports menstrual flow during cycle is light  Radha Galvan does report cramping with menstrual cycle  Radha Galvan does not currently have concerns about her menstrual cycle.    Patient reports is  sexually active with 1 male partner(s).   Patient denies  pain with sex .  Reports is  protecting against a pregnancy. Current method(s) including current partner has had a vasectomy   Patient is not using a barrier method with sexual encounters   Patient admits to a history of sexually transmitted infection(s)   If yes, including Herpes Simplex Virus  Patient does not want screening for sexually transmitted infection(s).   Radha Galvan requesting testing for n/a    Reported previous OB history:   Radha Galvan has been pregnant 2 times, reports has 2 biological children, reports history of 2 term deliveries, reports a history of 2 vaginal deliveries, reports no history of birth complications, reports no previous pregnancy complications, does not desire a future pregnancy     Pertinent Chronic Health Conditions/Medications: Yes  HSV2- valtrex 1g QD  Routinely maybe

## 2025-03-04 NOTE — PROGRESS NOTES
Patient is here for her annual exam  Last pap was 1/28/2022 normal, negative HPV   Last delfino was 11/5/2024 normal   Last Dexa scan was n/a   Patient would like a refill of her    Chaperone for Intimate Exam  Chaperone was offered as part of the rooming process. Patient declined and agrees to continue with exam without a chaperone.         GAD7- 1  PHQ2- 0

## 2025-03-06 LAB
HPV I/H RISK 4 DNA CVX QL NAA+PROBE: NOT DETECTED
HPV SAMPLE: NORMAL
HPV, INTERPRETATION: NORMAL
HPV16 DNA CVX QL NAA+PROBE: NOT DETECTED
HPV18 DNA CVX QL NAA+PROBE: NOT DETECTED
SPECIMEN DESCRIPTION: NORMAL

## 2025-03-08 LAB — CYTOLOGY REPORT: NORMAL

## 2025-03-10 ENCOUNTER — RESULTS FOLLOW-UP (OUTPATIENT)
Dept: LAB | Age: 45
End: 2025-03-10